# Patient Record
Sex: MALE | Race: WHITE | NOT HISPANIC OR LATINO
[De-identification: names, ages, dates, MRNs, and addresses within clinical notes are randomized per-mention and may not be internally consistent; named-entity substitution may affect disease eponyms.]

---

## 2018-02-07 ENCOUNTER — ANCILLARY ORDERS (OUTPATIENT)
Dept: OTHER | Age: 28
End: 2018-02-07

## 2018-02-07 DIAGNOSIS — M54.41 LOW BACK PAIN WITH RIGHT-SIDED SCIATICA: ICD-10-CM

## 2018-02-07 DIAGNOSIS — N20.0 CALCULUS OF KIDNEY: ICD-10-CM

## 2018-07-11 ENCOUNTER — HOSPITAL ENCOUNTER (EMERGENCY)
Facility: HOSPITAL | Age: 28
Discharge: NURSING, REHAB OR LONG-TERM CARE | End: 2018-07-11
Attending: EMERGENCY MEDICINE
Payer: COMMERCIAL

## 2018-07-11 VITALS
BODY MASS INDEX: 19.64 KG/M2 | RESPIRATION RATE: 20 BRPM | TEMPERATURE: 98 F | WEIGHT: 145 LBS | HEIGHT: 72 IN | HEART RATE: 85 BPM | SYSTOLIC BLOOD PRESSURE: 129 MMHG | DIASTOLIC BLOOD PRESSURE: 76 MMHG | OXYGEN SATURATION: 98 %

## 2018-07-11 DIAGNOSIS — R45.851 SUICIDAL IDEATION: ICD-10-CM

## 2018-07-11 DIAGNOSIS — F32.A DEPRESSION, UNSPECIFIED DEPRESSION TYPE: Primary | ICD-10-CM

## 2018-07-11 PROBLEM — F32.9 MDD (MAJOR DEPRESSIVE DISORDER): Status: ACTIVE | Noted: 2018-07-11

## 2018-07-11 LAB
ACETAMINOPHEN: <2 UG/ML (ref ?–2)
ALBUMIN SERPL-MCNC: 4.2 G/DL (ref 3.5–4.8)
ALP LIVER SERPL-CCNC: 74 U/L (ref 45–117)
ALT SERPL-CCNC: 17 U/L (ref 17–63)
AMPHETAMINE URINE: NEGATIVE
AST SERPL-CCNC: 11 U/L (ref 15–41)
BARBITURATES URINE: NEGATIVE
BASOPHILS # BLD AUTO: 0.03 X10(3) UL (ref 0–0.1)
BASOPHILS NFR BLD AUTO: 0.2 %
BENZODIAZEPINES URINE: NEGATIVE
BILIRUB SERPL-MCNC: 0.9 MG/DL (ref 0.1–2)
BUN BLD-MCNC: 12 MG/DL (ref 8–20)
CALCIUM BLD-MCNC: 9 MG/DL (ref 8.3–10.3)
CHLORIDE: 103 MMOL/L (ref 101–111)
CO2: 27 MMOL/L (ref 22–32)
CREAT BLD-MCNC: 1.16 MG/DL (ref 0.7–1.3)
EOSINOPHIL # BLD AUTO: 0.01 X10(3) UL (ref 0–0.3)
EOSINOPHIL NFR BLD AUTO: 0.1 %
ERYTHROCYTE [DISTWIDTH] IN BLOOD BY AUTOMATED COUNT: 12.9 % (ref 11.5–16)
ETHYL ALCOHOL, QUALITATIVE: NEGATIVE
ETHYL ALCOHOL: <3 MG/DL (ref ?–3)
GLUCOSE BLD-MCNC: 99 MG/DL (ref 70–99)
HCT VFR BLD AUTO: 46.3 % (ref 37–53)
HGB BLD-MCNC: 15.9 G/DL (ref 13–17)
IMMATURE GRANULOCYTE COUNT: 0.04 X10(3) UL (ref 0–1)
IMMATURE GRANULOCYTE RATIO %: 0.3 %
LYMPHOCYTES # BLD AUTO: 2.07 X10(3) UL (ref 0.9–4)
LYMPHOCYTES NFR BLD AUTO: 15.2 %
M PROTEIN MFR SERPL ELPH: 7.5 G/DL (ref 6.1–8.3)
MCH RBC QN AUTO: 29.8 PG (ref 27–33.2)
MCHC RBC AUTO-ENTMCNC: 34.3 G/DL (ref 31–37)
MCV RBC AUTO: 86.7 FL (ref 80–99)
MONOCYTES # BLD AUTO: 0.78 X10(3) UL (ref 0.1–1)
MONOCYTES NFR BLD AUTO: 5.7 %
NEUTROPHIL ABS PRELIM: 10.71 X10 (3) UL (ref 1.3–6.7)
NEUTROPHILS # BLD AUTO: 10.71 X10(3) UL (ref 1.3–6.7)
NEUTROPHILS NFR BLD AUTO: 78.5 %
OPIATE URINE: NEGATIVE
PCP URINE: NEGATIVE
PLATELET # BLD AUTO: 260 10(3)UL (ref 150–450)
POTASSIUM SERPL-SCNC: 3.5 MMOL/L (ref 3.6–5.1)
RBC # BLD AUTO: 5.34 X10(6)UL (ref 4.3–5.7)
RED CELL DISTRIBUTION WIDTH-SD: 40.6 FL (ref 35.1–46.3)
SALICYLATE: <1.7 MG/DL (ref ?–1.7)
SODIUM SERPL-SCNC: 141 MMOL/L (ref 136–144)
WBC # BLD AUTO: 13.6 X10(3) UL (ref 4–13)

## 2018-07-11 PROCEDURE — 85025 COMPLETE CBC W/AUTO DIFF WBC: CPT | Performed by: EMERGENCY MEDICINE

## 2018-07-11 PROCEDURE — 36415 COLL VENOUS BLD VENIPUNCTURE: CPT

## 2018-07-11 PROCEDURE — 80320 DRUG SCREEN QUANTALCOHOLS: CPT | Performed by: EMERGENCY MEDICINE

## 2018-07-11 PROCEDURE — 80053 COMPREHEN METABOLIC PANEL: CPT | Performed by: EMERGENCY MEDICINE

## 2018-07-11 PROCEDURE — 99285 EMERGENCY DEPT VISIT HI MDM: CPT

## 2018-07-11 PROCEDURE — 80307 DRUG TEST PRSMV CHEM ANLYZR: CPT | Performed by: EMERGENCY MEDICINE

## 2018-07-11 PROCEDURE — 80329 ANALGESICS NON-OPIOID 1 OR 2: CPT | Performed by: EMERGENCY MEDICINE

## 2018-07-11 RX ORDER — TRAZODONE HYDROCHLORIDE 50 MG/1
50 TABLET ORAL NIGHTLY PRN
Status: DISCONTINUED | OUTPATIENT
Start: 2018-07-11 | End: 2018-07-11

## 2018-07-11 RX ORDER — LORAZEPAM 1 MG/1
2 TABLET ORAL EVERY 4 HOURS PRN
Status: DISCONTINUED | OUTPATIENT
Start: 2018-07-11 | End: 2018-07-11

## 2018-07-11 RX ORDER — LORAZEPAM 1 MG/1
0.5 TABLET ORAL EVERY 4 HOURS PRN
Status: DISCONTINUED | OUTPATIENT
Start: 2018-07-11 | End: 2018-07-11

## 2018-07-11 RX ORDER — LORAZEPAM 2 MG/ML
1 INJECTION INTRAMUSCULAR EVERY 4 HOURS PRN
Status: DISCONTINUED | OUTPATIENT
Start: 2018-07-11 | End: 2018-07-11

## 2018-07-11 RX ORDER — LORAZEPAM 2 MG/ML
2 INJECTION INTRAMUSCULAR EVERY 4 HOURS PRN
Status: DISCONTINUED | OUTPATIENT
Start: 2018-07-11 | End: 2018-07-11

## 2018-07-11 RX ORDER — LORAZEPAM 1 MG/1
1 TABLET ORAL EVERY 4 HOURS PRN
Status: DISCONTINUED | OUTPATIENT
Start: 2018-07-11 | End: 2018-07-11

## 2018-07-11 RX ORDER — HALOPERIDOL 5 MG/ML
5 INJECTION INTRAMUSCULAR EVERY 4 HOURS PRN
Status: DISCONTINUED | OUTPATIENT
Start: 2018-07-11 | End: 2018-07-11

## 2018-07-11 RX ORDER — LORAZEPAM 2 MG/ML
0.5 INJECTION INTRAMUSCULAR EVERY 4 HOURS PRN
Status: DISCONTINUED | OUTPATIENT
Start: 2018-07-11 | End: 2018-07-11

## 2018-07-11 RX ORDER — HALOPERIDOL 5 MG
5 TABLET ORAL EVERY 4 HOURS PRN
Status: DISCONTINUED | OUTPATIENT
Start: 2018-07-11 | End: 2018-07-11

## 2018-07-11 NOTE — BH LEVEL OF CARE ASSESSMENT
Level of Care Assessment Note    General Questions  Why are you here?: Per pt, \"just depression\". Precipitating Events: \"Someone called the police\". \"I accidentally said stupid things\". \"I said I was tired\". \"People say it everyday\".   \"I nev dead or wished you could go to sleep and not wake up? (past 30 days): Yes  2. Have you actually had any thoughts of killing yourself? (past 30 days): Yes  3. Have you been thinking about how you might kill yourself? (past 30 days): No  6.  Have you ever don school. \"Nothing serious\". \"Stupid and immature\". Denies hx of attempts.    Family History or Personal Lived Experience of Loss or Near Loss by Suicide: Denies    Danger to Others/Property  Have you harmed someone or had thoughts about wanting someon not engaging with friends or family. Per pt, \"I feel short tempered\". Reports he feels okay when he is at work because he is busy. Reports when he is at home and alone, thoughts about previous relationship trigger sadness and anger.     Anxiety Symptom Withdrawal Symptoms  History of Withdrawal Symptoms: Vomiting;Nausea; Tremors;Sweating;Irritability  Last Withdrawal Episode: Pt reports hx of withdrawal x1 at age 24 from heroin. \"Typical withdrawal\".   Reports detox at [facility] and residential pro symptoms. Pt making SI statements. Pt is resistant to treatment. Judgment: Poor  Fair/poor judgment as evidenced by: Pt reports increase in symptoms. Pt making SI statements through social media. Pt is in need of immediate hospitalization.    Thought P Change  Motivational Stage of Change: Preparation    Level of Care Recommendations  Consulted with: Dr. Rosy Walker is recommending inpatient hospitalization.  Dr. Rosy Walker aware additional collateral was not able to be obtained (pt refusing additional contacts/RO

## 2018-07-11 NOTE — ED NOTES
ARC report given to Augustine Leiva, [facility] aware that she can give nurse to nurse and transport pt after [phone].

## 2018-07-11 NOTE — ED NOTES
Consulted again with Dr. Aureliano Crowder. Pt does have insurance and bed available on [facility].  During time of disposition east was appropriate. During time of disposition given to pt, pt became increasingly agitated and verbally aggressive. Pt will be involuntary.

## 2018-07-11 NOTE — ED NOTES
Mirella RN is aware will need labs completed. LAILA to work on placement at [facility] once labs are complete.

## 2018-07-11 NOTE — ED NOTES
[facility] staff present with security. Discussed with pt that his insurance is active until August 31st. However, premium has not been met. Pt needs to pay premium in order for hospitalization to be covered.   Pt continued to be repetitive that he was going kina

## 2018-07-11 NOTE — ED NOTES
Pt sitting on stretcher yelling out profanities. Upon entering room pt states he needs to leave he has a job and animals to attend to; pt denies request for food or drink at this time.  Security accompanied RN

## 2018-07-11 NOTE — ED NOTES
Update pt that the doctor has recommended inpatient hospitalization. Pt became increasingly agitated and verbally aggressive. Pt reports he is not going inpatient and threatening to leave. KELLY Vu aware. Pt is on elopement risk.

## 2018-07-11 NOTE — ED NOTES
Report was given to Lizbeth Mnia at [facility]. Pt is very verbal about not being happy going to [facility] but is very redirectable. [facility] and [facility].  It was in this RN's opinion that it would cause more problems forcing medication on this patient for the

## 2018-07-11 NOTE — ED PROVIDER NOTES
Patient Seen in: [facility] Emergency Department    History   Patient presents with:  Eval-P (psychiatric)    Stated Complaint: eval p    HPI    Patient is 44-year-old male presents emergency room with a history of depression which is been ongoing f normocephalic, atraumatic. Pupils are 4 mm equally round and reactive to light. Oropharynx is clear. Mucous membranes are moist.  NECK: There is no focal tenderness to palpation appreciated. There is no JVD.  No meningeal signs or nuchal rigidity appreciate The following orders were created for panel order CBC WITH DIFFERENTIAL WITH PLATELET.   Procedure                               Abnormality         Status                     ---------                               -----------         ------

## 2018-07-11 NOTE — ED NOTES
Pt made S/I statements on [employer]. Friend called 911. Pt states he is feeling depressed over a breakup. Pt denies plan.

## 2018-07-11 NOTE — PROGRESS NOTES
[facility] staff present with security. Discussed with pt that his insurance is active until August 31st. However, premium has not been met. Pt needs to pay premium in order for hospitalization to be covered.   Pt continued to be repetitive that he was going kina

## 2018-07-11 NOTE — ED NOTES
Per LAILA pt was informed the he will be admitted inpatient where placement is available. Per Leonor Mckeon pt is high risk for elopement d/t making statement that [facility] will run out of here\".      Security should be notified when entering room

## 2018-07-12 PROBLEM — F33.2 SEVERE RECURRENT MAJOR DEPRESSION WITHOUT PSYCHOTIC FEATURES (HCC): Status: ACTIVE | Noted: 2018-07-11

## 2018-09-26 ENCOUNTER — HOSPITAL ENCOUNTER (EMERGENCY)
Facility: HOSPITAL | Age: 28
Discharge: HOME | End: 2018-09-26
Attending: EMERGENCY MEDICINE
Payer: COMMERCIAL

## 2018-09-26 VITALS
DIASTOLIC BLOOD PRESSURE: 89 MMHG | RESPIRATION RATE: 17 BRPM | WEIGHT: 140 LBS | SYSTOLIC BLOOD PRESSURE: 147 MMHG | HEART RATE: 70 BPM | OXYGEN SATURATION: 97 % | BODY MASS INDEX: 19 KG/M2 | TEMPERATURE: 99 F

## 2018-09-26 DIAGNOSIS — Z13.9 ENCOUNTER FOR MEDICAL SCREENING EXAMINATION: Primary | ICD-10-CM

## 2018-09-26 DIAGNOSIS — F32.A DEPRESSION, UNSPECIFIED DEPRESSION TYPE: ICD-10-CM

## 2018-09-26 PROCEDURE — 99284 EMERGENCY DEPT VISIT MOD MDM: CPT

## 2018-09-26 PROCEDURE — 99285 EMERGENCY DEPT VISIT HI MDM: CPT

## 2018-09-26 NOTE — ED NOTES
Family at the bedside. Patient denies discomfort. Offered both food and drinks and he denied need at this time. Plan of care discussed.

## 2018-09-26 NOTE — BH LEVEL OF CARE ASSESSMENT
Level of Care Assessment Note    General Questions  Why are you here?: \"I was reaching out to a frirend. I am an overdramatic person and I have depression. \"    Precipitating Events: Pt stated that he was sending messages to a friend who stated that he wa taking space to himself. She stated that pt is dedicated to his job. She stated that she has never been concerned that pt would harm himself. She denied safety concerns at this time.     Family's Biggest Areas of Concern: managing depression, anger sx access to means/firearms/weapons:  Mother-agrees with above     Self Injury  History of Self Injurious Behaviors: No  Present Self-Injurious Behaviors: No    Mental Health Symptoms  Hallucination Type: No problems reported or observed  Delusions: No problem Withdrawal Symptoms: (NA)  Last Withdrawal Episode: NA  Current Withdrawal Symptoms: No  Breathalyzer: (NA)    Compulsive Behaviors  Are you/others concerned about any of the following behaviors over the past 30 days?: Denies with treatment and wants further care with therapist, suicidal statements to get attention   Thought Patterns  Clarity/Relevance: Coherent;Logical;Relevant to topic  Flow: Organized  Content: Ordinary  Level of Consciousness: Alert  Level of Consciousness: Level of Care Recommendation: Partial Hospitalization  Program: Adult;Mental Health  Location: [address]  Reason for Unit Assigned: Age, sx   Partial Criteria: Moderate to severe impairment in role performance; High-risk or unsupportive environment; Inad

## 2018-09-26 NOTE — ED NOTES
Patient is calm and cooperative states he is here because is said something stupid.  Denies SI but states he is depressed

## 2018-09-26 NOTE — ED PROVIDER NOTES
Patient Seen in: [facility] Emergency Department    History   Patient presents with:  Eval-P (psychiatric)    Stated Complaint: Eval-P    HPI    Patient's 71-year-old male brought in by EMS for concerns of suicidal ideation.   Patient states he was f well-developed and well-nourished. Non-toxic appearance. No distress. HENT:   Head: Normocephalic and atraumatic. Eyes: Conjunctivae are normal. No scleral icterus. Neck: Normal range of motion. Neck supple.    Cardiovascular: Normal rate and intact

## 2018-09-26 NOTE — ED NOTES
Plan of care discussed with patient and mother.  Both state comfort with plan of care including discharge

## 2018-09-26 NOTE — ED INITIAL ASSESSMENT (HPI)
Patient arrives per EMS for evaluation of suicidal ideation. Patient sent text messages to friend stating Julienne Small should I be here anymore, I should just take cocaine to stop breathing\" Friend called PD for a well being check.

## 2020-01-21 ENCOUNTER — WALK IN (OUTPATIENT)
Dept: URGENT CARE | Age: 30
End: 2020-01-21

## 2020-01-21 VITALS
OXYGEN SATURATION: 98 % | TEMPERATURE: 98.2 F | HEART RATE: 118 BPM | BODY MASS INDEX: 20.3 KG/M2 | HEIGHT: 71 IN | RESPIRATION RATE: 18 BRPM | WEIGHT: 145 LBS | DIASTOLIC BLOOD PRESSURE: 70 MMHG | SYSTOLIC BLOOD PRESSURE: 124 MMHG

## 2020-01-21 DIAGNOSIS — B34.9 VIRAL ILLNESS: Primary | ICD-10-CM

## 2020-01-21 PROCEDURE — 99203 OFFICE O/P NEW LOW 30 MIN: CPT | Performed by: NURSE PRACTITIONER

## 2020-01-21 ASSESSMENT — ENCOUNTER SYMPTOMS
ABDOMINAL PAIN: 0
SORE THROAT: 1
VOMITING: 1
CHILLS: 1
COUGH: 1
WHEEZING: 0
FATIGUE: 1
SHORTNESS OF BREATH: 0
NAUSEA: 1
FEVER: 1
HEADACHES: 0

## 2020-01-21 ASSESSMENT — COPD QUESTIONNAIRES: COPD: 0

## 2023-04-03 ENCOUNTER — TELEPHONE (OUTPATIENT)
Dept: FAMILY MEDICINE CLINIC | Facility: CLINIC | Age: 33
End: 2023-04-03

## 2023-04-04 ENCOUNTER — PATIENT OUTREACH (OUTPATIENT)
Dept: CASE MANAGEMENT | Age: 33
End: 2023-04-04

## 2023-04-04 NOTE — PROCEDURES
The office order for PCP removal request is Approved and finalized on April 4, 2023.     Thanks,  [facility] [employer]

## 2023-12-28 DIAGNOSIS — J45.990 EXERCISE-INDUCED ASTHMA: ICD-10-CM

## 2023-12-29 RX ORDER — ALBUTEROL SULFATE 90 UG/1
2 AEROSOL, METERED RESPIRATORY (INHALATION)
Qty: 1 EACH | Refills: 0 | OUTPATIENT
Start: 2023-12-29

## 2024-07-17 ENCOUNTER — APPOINTMENT (OUTPATIENT)
Dept: OTHER | Facility: HOSPITAL | Age: 34
End: 2024-07-17
Attending: PREVENTIVE MEDICINE

## (undated) NOTE — ED AVS SNAPSHOT
Mr. [place] Yuen   MRN: [id number]    Department:  [facility] Emergency Department   Date of Visit:  9/26/2018           Disclosure     Insurance plans vary and the physician(s) referred by the ER may not be covered by your plan.  Please contact tell this physician (or your personal doctor if your instructions are to return to your personal doctor) about any new or lasting problems. The primary care or specialist physician will see patients referred from the [facility] Emergency Department.  Meredith Garrett